# Patient Record
Sex: MALE | Race: WHITE | NOT HISPANIC OR LATINO | Employment: FULL TIME | URBAN - NONMETROPOLITAN AREA
[De-identification: names, ages, dates, MRNs, and addresses within clinical notes are randomized per-mention and may not be internally consistent; named-entity substitution may affect disease eponyms.]

---

## 2023-11-08 ENCOUNTER — OFFICE VISIT (OUTPATIENT)
Dept: CARDIOLOGY | Facility: CLINIC | Age: 38
End: 2023-11-08
Payer: MEDICAID

## 2023-11-08 VITALS
WEIGHT: 312 LBS | BODY MASS INDEX: 41.35 KG/M2 | SYSTOLIC BLOOD PRESSURE: 162 MMHG | HEART RATE: 65 BPM | HEIGHT: 73 IN | OXYGEN SATURATION: 100 % | DIASTOLIC BLOOD PRESSURE: 114 MMHG

## 2023-11-08 DIAGNOSIS — R00.2 PALPITATIONS: ICD-10-CM

## 2023-11-08 DIAGNOSIS — R06.02 SHORTNESS OF BREATH: ICD-10-CM

## 2023-11-08 DIAGNOSIS — R07.9 CHEST PAIN, UNSPECIFIED TYPE: Primary | ICD-10-CM

## 2023-11-08 PROCEDURE — 99204 OFFICE O/P NEW MOD 45 MIN: CPT | Performed by: PHYSICIAN ASSISTANT

## 2023-11-08 RX ORDER — LISINOPRIL 10 MG/1
10 TABLET ORAL DAILY
COMMUNITY
End: 2023-11-08 | Stop reason: SDUPTHER

## 2023-11-08 RX ORDER — ATORVASTATIN CALCIUM 10 MG/1
10 TABLET, FILM COATED ORAL
COMMUNITY
Start: 2022-09-15

## 2023-11-08 RX ORDER — OMEPRAZOLE 20 MG/1
CAPSULE, DELAYED RELEASE ORAL
COMMUNITY
Start: 2023-08-22

## 2023-11-08 RX ORDER — LISINOPRIL 10 MG/1
10 TABLET ORAL 2 TIMES DAILY
Qty: 60 TABLET | Refills: 5 | Status: SHIPPED | OUTPATIENT
Start: 2023-11-08

## 2023-11-08 RX ORDER — CHOLECALCIFEROL (VITAMIN D3) 25 MCG
TABLET,CHEWABLE ORAL
COMMUNITY
Start: 2023-10-13

## 2023-11-08 RX ORDER — NITROGLYCERIN 0.4 MG/1
0.4 TABLET SUBLINGUAL
COMMUNITY
Start: 2023-08-17

## 2023-11-08 RX ORDER — ERGOCALCIFEROL (VITAMIN D2) 10 MCG
TABLET ORAL
COMMUNITY
Start: 2023-10-13

## 2023-11-08 NOTE — LETTER
November 8, 2023       No Recipients    Patient: Endy Pascual   YOB: 1985   Date of Visit: 11/8/2023       Dear JUNIOR Ross    Endy Pascual was in my office today. Below is a copy of my note.    If you have questions, please do not hesitate to call me. I look forward to following Endy along with you.         Sincerely,        JAIME Schofield        CC:   No Recipients    Problem list     Subjective  Endy Pascual is a 38 y.o. male     Chief Complaint   Patient presents with   • Establish Care   • Chest Pain   • Shortness of Breath   • Hypertension       HPI    Patient is a 38-year-old male who presents to the office to be evaluated.    Patient does not describe having history of coronary disease or structural heart disease.  He has had issues in regards to chest discomfort and palpitations.  He went to the emergency room with complaints of discomfort and will experience this near the substernal region.  Apparently work-up there was benign and he was discharged.    He describes having issues with palpitations.  Patient describes that is what told him that he was having atrial fibrillation and apparently palpitations woke him up in the morning.  He ended up going back to the ER but he was told he did not have atrial fibrillation.    Patient is not sure what is causing his symptoms.  He describes his primary considering possible GI issues but is felt just this random discomfort.  He is mildly dyspneic but does not describe any progressive shortness of breath.  No PND or orthopnea.    He continues to palpitate occasionally.  He does not describe dizziness, presyncope, or syncope.  Patient is stable otherwise.      Current Outpatient Medications on File Prior to Visit   Medication Sig Dispense Refill   • atorvastatin (LIPITOR) 10 MG tablet 1 tablet.     • Cyanocobalamin (B-12) 1000 MCG capsule      • Ergocalciferol (Vitamin D2) 10 MCG (400 UNIT) tablet      • lisinopril  "(PRINIVIL,ZESTRIL) 10 MG tablet 1 tablet Daily.     • nitroglycerin (NITROSTAT) 0.4 MG SL tablet 1 tablet.     • omeprazole (priLOSEC) 20 MG capsule TAKE ONE CAPSULE BY MOUTH DAILY 30 MINUTES BEFORE MORNING MEAL       No current facility-administered medications on file prior to visit.       Sulfa antibiotics    Past Medical History:   Diagnosis Date   • High cholesterol    • Hypertension        Social History     Socioeconomic History   • Marital status:    Tobacco Use   • Smoking status: Every Day     Years: 19     Types: Cigarettes   • Smokeless tobacco: Never   Substance and Sexual Activity   • Alcohol use: Yes     Comment: daily   • Drug use: Never   • Sexual activity: Defer       Family History   Problem Relation Age of Onset   • Atrial fibrillation Mother    • Hypertension Father    • Heart attack Maternal Uncle    • Heart attack Maternal Grandmother        Review of Systems   Constitutional: Negative.    HENT: Negative.     Respiratory:  Positive for shortness of breath. Negative for apnea, cough, chest tightness and wheezing.    Cardiovascular:  Positive for chest pain and palpitations.   Gastrointestinal: Negative.  Negative for blood in stool.   Endocrine: Negative.    Genitourinary: Negative.  Negative for hematuria.   Musculoskeletal: Negative.    Skin: Negative.  Negative for color change, rash and wound.   Allergic/Immunologic: Negative.    Neurological:  Negative for dizziness, syncope, weakness, light-headedness, numbness and headaches.   Hematological: Negative.  Does not bruise/bleed easily.   Psychiatric/Behavioral: Negative.  Negative for sleep disturbance.        Objective  Vitals:    11/08/23 1120   BP: (!) 162/114   Pulse: 65   SpO2: 100%   Weight: (!) 142 kg (312 lb)   Height: 185.4 cm (73\")      BP (!) 162/114   Pulse 65   Ht 185.4 cm (73\")   Wt (!) 142 kg (312 lb)   SpO2 100%   BMI 41.16 kg/m²     Lab Results (most recent)       None            Physical Exam  Vitals and nursing " note reviewed.   Constitutional:       General: He is not in acute distress.     Appearance: Normal appearance. He is well-developed.   HENT:      Head: Normocephalic and atraumatic.   Eyes:      General: No scleral icterus.        Right eye: No discharge.         Left eye: No discharge.      Conjunctiva/sclera: Conjunctivae normal.   Neck:      Vascular: No carotid bruit.   Cardiovascular:      Rate and Rhythm: Normal rate and regular rhythm.      Heart sounds: Normal heart sounds. No murmur heard.     No friction rub. No gallop.   Pulmonary:      Effort: Pulmonary effort is normal. No respiratory distress.      Breath sounds: Normal breath sounds. No wheezing or rales.   Chest:      Chest wall: No tenderness.   Musculoskeletal:      Right lower leg: No edema.      Left lower leg: No edema.   Skin:     General: Skin is warm and dry.      Coloration: Skin is not pale.      Findings: No erythema or rash.   Neurological:      Mental Status: He is alert and oriented to person, place, and time.      Cranial Nerves: No cranial nerve deficit.   Psychiatric:         Behavior: Behavior normal.         Procedure  Procedures       Assessment & Plan    Problems Addressed this Visit          Cardiac and Vasculature    Palpitations    Relevant Orders    Adult Transthoracic Echo Complete W/ Cont if Necessary Per Protocol    Treadmill Stress Test    Cardiac Event Monitor    Chest pain - Primary    Relevant Orders    Adult Transthoracic Echo Complete W/ Cont if Necessary Per Protocol    Treadmill Stress Test    Cardiac Event Monitor       Pulmonary and Pneumonias    Shortness of breath    Relevant Orders    Adult Transthoracic Echo Complete W/ Cont if Necessary Per Protocol    Treadmill Stress Test    Cardiac Event Monitor     Diagnoses         Codes Comments    Chest pain, unspecified type    -  Primary ICD-10-CM: R07.9  ICD-9-CM: 786.50     Shortness of breath     ICD-10-CM: R06.02  ICD-9-CM: 786.05     Palpitations      ICD-10-CM: R00.2  ICD-9-CM: 785.1             Recommendation  1.  Patient is a 38-year-old male who presents to the office to be evaluated.  Patient has complaints of chest discomfort, palpitations and dyspnea.  He is also hypertensive.    2.  We will order regular treadmill stress test for evaluation.  Echo to assess and evaluate LV systolic and diastolic function, valvular structures etc.    3.  2-week event monitor to evaluate for any arrhythmic substrate.    4.  Patient is tearful on examination and anxiety could be contributing.  I am increasing his lisinopril to 10 mg twice a day to help with his blood pressure.  If symptoms were to worsen, I want him to call the office as discussed.    5.  We will see patient back for follow-up after testing and recommend further.  Follow-up with primary as scheduled.         Endy Pascual  reports that he has been smoking cigarettes. He has never used smokeless tobacco.. I have educated him on the risk of diseases from using tobacco products such as cancer, COPD, and heart disease.     I advised him to quit and he is not willing to quit.    I spent 3  minutes counseling the patient.        Patient did not bring med list or medicine bottles to appointment, med list has been reviewed and updated based on patient's knowledge of their meds.      Electronically signed by:

## 2023-11-08 NOTE — PROGRESS NOTES
Problem list     Subjective   Endy Pascual is a 38 y.o. male     Chief Complaint   Patient presents with    Establish Care    Chest Pain    Shortness of Breath    Hypertension       HPI    Patient is a 38-year-old male who presents to the office to be evaluated.    Patient does not describe having history of coronary disease or structural heart disease.  He has had issues in regards to chest discomfort and palpitations.  He went to the emergency room with complaints of discomfort and will experience this near the substernal region.  Apparently work-up there was benign and he was discharged.    He describes having issues with palpitations.  Patient describes that is what told him that he was having atrial fibrillation and apparently palpitations woke him up in the morning.  He ended up going back to the ER but he was told he did not have atrial fibrillation.    Patient is not sure what is causing his symptoms.  He describes his primary considering possible GI issues but is felt just this random discomfort.  He is mildly dyspneic but does not describe any progressive shortness of breath.  No PND or orthopnea.    He continues to palpitate occasionally.  He does not describe dizziness, presyncope, or syncope.  Patient is stable otherwise.      Current Outpatient Medications on File Prior to Visit   Medication Sig Dispense Refill    atorvastatin (LIPITOR) 10 MG tablet 1 tablet.      Cyanocobalamin (B-12) 1000 MCG capsule       Ergocalciferol (Vitamin D2) 10 MCG (400 UNIT) tablet       lisinopril (PRINIVIL,ZESTRIL) 10 MG tablet 1 tablet Daily.      nitroglycerin (NITROSTAT) 0.4 MG SL tablet 1 tablet.      omeprazole (priLOSEC) 20 MG capsule TAKE ONE CAPSULE BY MOUTH DAILY 30 MINUTES BEFORE MORNING MEAL       No current facility-administered medications on file prior to visit.       Sulfa antibiotics    Past Medical History:   Diagnosis Date    High cholesterol     Hypertension        Social History     Socioeconomic  "History    Marital status:    Tobacco Use    Smoking status: Every Day     Years: 19     Types: Cigarettes    Smokeless tobacco: Never   Substance and Sexual Activity    Alcohol use: Yes     Comment: daily    Drug use: Never    Sexual activity: Defer       Family History   Problem Relation Age of Onset    Atrial fibrillation Mother     Hypertension Father     Heart attack Maternal Uncle     Heart attack Maternal Grandmother        Review of Systems   Constitutional: Negative.    HENT: Negative.     Respiratory:  Positive for shortness of breath. Negative for apnea, cough, chest tightness and wheezing.    Cardiovascular:  Positive for chest pain and palpitations.   Gastrointestinal: Negative.  Negative for blood in stool.   Endocrine: Negative.    Genitourinary: Negative.  Negative for hematuria.   Musculoskeletal: Negative.    Skin: Negative.  Negative for color change, rash and wound.   Allergic/Immunologic: Negative.    Neurological:  Negative for dizziness, syncope, weakness, light-headedness, numbness and headaches.   Hematological: Negative.  Does not bruise/bleed easily.   Psychiatric/Behavioral: Negative.  Negative for sleep disturbance.        Objective   Vitals:    11/08/23 1120   BP: (!) 162/114   Pulse: 65   SpO2: 100%   Weight: (!) 142 kg (312 lb)   Height: 185.4 cm (73\")      BP (!) 162/114   Pulse 65   Ht 185.4 cm (73\")   Wt (!) 142 kg (312 lb)   SpO2 100%   BMI 41.16 kg/m²     Lab Results (most recent)       None            Physical Exam  Vitals and nursing note reviewed.   Constitutional:       General: He is not in acute distress.     Appearance: Normal appearance. He is well-developed.   HENT:      Head: Normocephalic and atraumatic.   Eyes:      General: No scleral icterus.        Right eye: No discharge.         Left eye: No discharge.      Conjunctiva/sclera: Conjunctivae normal.   Neck:      Vascular: No carotid bruit.   Cardiovascular:      Rate and Rhythm: Normal rate and regular " rhythm.      Heart sounds: Normal heart sounds. No murmur heard.     No friction rub. No gallop.   Pulmonary:      Effort: Pulmonary effort is normal. No respiratory distress.      Breath sounds: Normal breath sounds. No wheezing or rales.   Chest:      Chest wall: No tenderness.   Musculoskeletal:      Right lower leg: No edema.      Left lower leg: No edema.   Skin:     General: Skin is warm and dry.      Coloration: Skin is not pale.      Findings: No erythema or rash.   Neurological:      Mental Status: He is alert and oriented to person, place, and time.      Cranial Nerves: No cranial nerve deficit.   Psychiatric:         Behavior: Behavior normal.         Procedure   Procedures       Assessment & Plan     Problems Addressed this Visit          Cardiac and Vasculature    Palpitations    Relevant Orders    Adult Transthoracic Echo Complete W/ Cont if Necessary Per Protocol    Treadmill Stress Test    Cardiac Event Monitor    Chest pain - Primary    Relevant Orders    Adult Transthoracic Echo Complete W/ Cont if Necessary Per Protocol    Treadmill Stress Test    Cardiac Event Monitor       Pulmonary and Pneumonias    Shortness of breath    Relevant Orders    Adult Transthoracic Echo Complete W/ Cont if Necessary Per Protocol    Treadmill Stress Test    Cardiac Event Monitor     Diagnoses         Codes Comments    Chest pain, unspecified type    -  Primary ICD-10-CM: R07.9  ICD-9-CM: 786.50     Shortness of breath     ICD-10-CM: R06.02  ICD-9-CM: 786.05     Palpitations     ICD-10-CM: R00.2  ICD-9-CM: 785.1             Recommendation  1.  Patient is a 38-year-old male who presents to the office to be evaluated.  Patient has complaints of chest discomfort, palpitations and dyspnea.  He is also hypertensive.    2.  We will order regular treadmill stress test for evaluation.  Echo to assess and evaluate LV systolic and diastolic function, valvular structures etc.    3.  2-week event monitor to evaluate for any  arrhythmic substrate.    4.  Patient is tearful on examination and anxiety could be contributing.  I am increasing his lisinopril to 10 mg twice a day to help with his blood pressure.  If symptoms were to worsen, I want him to call the office as discussed.    5.  We will see patient back for follow-up after testing and recommend further.  Follow-up with primary as scheduled.         Endy Pascual  reports that he has been smoking cigarettes. He has never used smokeless tobacco.. I have educated him on the risk of diseases from using tobacco products such as cancer, COPD, and heart disease.     I advised him to quit and he is not willing to quit.    I spent 3  minutes counseling the patient.        Patient did not bring med list or medicine bottles to appointment, med list has been reviewed and updated based on patient's knowledge of their meds.      Electronically signed by:

## 2023-12-19 ENCOUNTER — HOSPITAL ENCOUNTER (OUTPATIENT)
Dept: CARDIOLOGY | Facility: HOSPITAL | Age: 38
Discharge: HOME | End: 2023-12-19
Payer: MEDICAID

## 2023-12-19 DIAGNOSIS — R06.02 SHORTNESS OF BREATH: ICD-10-CM

## 2023-12-19 DIAGNOSIS — R07.9 CHEST PAIN, UNSPECIFIED TYPE: ICD-10-CM

## 2023-12-19 DIAGNOSIS — R00.2 PALPITATIONS: ICD-10-CM

## 2023-12-19 PROCEDURE — 93306 TTE W/DOPPLER COMPLETE: CPT

## 2023-12-19 PROCEDURE — 93017 CV STRESS TEST TRACING ONLY: CPT

## 2023-12-21 LAB
BH CV STRESS DURATION MIN STAGE 1: 3
BH CV STRESS DURATION SEC STAGE 1: 0
BH CV STRESS GRADE STAGE 1: 10
BH CV STRESS METS STAGE 1: 5
BH CV STRESS PROTOCOL 1: NORMAL
BH CV STRESS RECOVERY BP: NORMAL MMHG
BH CV STRESS RECOVERY HR: 100 BPM
BH CV STRESS SPEED STAGE 1: 1.7
BH CV STRESS STAGE 1: 1
MAXIMAL PREDICTED HEART RATE: 183 BPM
PERCENT MAX PREDICTED HR: 88.52 %
STRESS BASELINE BP: NORMAL MMHG
STRESS BASELINE HR: 80 BPM
STRESS PERCENT HR: 104 %
STRESS POST ESTIMATED WORKLOAD: 10.1 METS
STRESS POST EXERCISE DUR MIN: 7 MIN
STRESS POST EXERCISE DUR SEC: 35 SEC
STRESS POST PEAK BP: NORMAL MMHG
STRESS POST PEAK HR: 162 BPM
STRESS TARGET HR: 156 BPM

## 2023-12-25 LAB
BH CV ECHO MEAS - ACS: 2.7 CM
BH CV ECHO MEAS - AO MAX PG: 5.6 MMHG
BH CV ECHO MEAS - AO MEAN PG: 3 MMHG
BH CV ECHO MEAS - AO ROOT DIAM: 3.9 CM
BH CV ECHO MEAS - AO V2 MAX: 118 CM/SEC
BH CV ECHO MEAS - AO V2 VTI: 22 CM
BH CV ECHO MEAS - EDV(CUBED): 77.3 ML
BH CV ECHO MEAS - EDV(MOD-SP4): 175 ML
BH CV ECHO MEAS - EF(MOD-SP4): 53 %
BH CV ECHO MEAS - EF_3D-VOL: 66 %
BH CV ECHO MEAS - ESV(CUBED): 19.5 ML
BH CV ECHO MEAS - ESV(MOD-SP4): 82.3 ML
BH CV ECHO MEAS - FS: 36.9 %
BH CV ECHO MEAS - IVS/LVPW: 1.03 CM
BH CV ECHO MEAS - IVSD: 1.44 CM
BH CV ECHO MEAS - LA DIMENSION: 3.9 CM
BH CV ECHO MEAS - LAT PEAK E' VEL: 12.5 CM/SEC
BH CV ECHO MEAS - LV DIASTOLIC VOL/BSA (35-75): 68 CM2
BH CV ECHO MEAS - LV MASS(C)D: 234.1 GRAMS
BH CV ECHO MEAS - LV SYSTOLIC VOL/BSA (12-30): 32 CM2
BH CV ECHO MEAS - LVIDD: 4.3 CM
BH CV ECHO MEAS - LVIDS: 2.7 CM
BH CV ECHO MEAS - LVPWD: 1.4 CM
BH CV ECHO MEAS - MED PEAK E' VEL: 8.6 CM/SEC
BH CV ECHO MEAS - MV A MAX VEL: 47.6 CM/SEC
BH CV ECHO MEAS - MV DEC TIME: 0.17 SEC
BH CV ECHO MEAS - MV E MAX VEL: 66 CM/SEC
BH CV ECHO MEAS - MV E/A: 1.39
BH CV ECHO MEAS - RVDD: 3.4 CM
BH CV ECHO MEAS - SI(MOD-SP4): 36 ML/M2
BH CV ECHO MEAS - SV(MOD-SP4): 92.7 ML
BH CV ECHO MEASUREMENTS AVERAGE E/E' RATIO: 6.26
BH CV XLRA - RV BASE: 4.2 CM
BH CV XLRA - RV LENGTH: 8.7 CM
BH CV XLRA - RV MID: 3.2 CM
LEFT ATRIUM VOLUME INDEX: 23.6 ML/M2

## 2024-01-03 ENCOUNTER — TELEPHONE (OUTPATIENT)
Dept: CARDIOLOGY | Facility: CLINIC | Age: 39
End: 2024-01-03
Payer: MEDICAID

## 2024-01-03 NOTE — TELEPHONE ENCOUNTER
----- Message from Haley Solomon Rep sent at 1/3/2024 10:56 AM EST -----    ----- Message -----  From: Shayy Rizvi RegSched Rep  Sent: 12/27/2023   3:44 PM EST  To: GERMAN Altman      ----- Message -----  From: Shawanda Dooley APRN  Sent: 12/26/2023   9:55 AM EST  To: [facility] [place]    No significant findings on echocardiogram.  Follow up pending review of other testing.

## 2024-01-03 NOTE — TELEPHONE ENCOUNTER
First attempt to reach pt. Left a voicemail for pt to return my call at [phone].       RELAY    Please inform pt of normal stress and echo.

## 2024-01-04 NOTE — TELEPHONE ENCOUNTER
Patient left message that he missed a call. Attempted to call patient however mailbox full.     RELAY     Please inform pt of normal stress and echo.